# Patient Record
Sex: MALE | Race: WHITE | NOT HISPANIC OR LATINO | Employment: FULL TIME | URBAN - METROPOLITAN AREA
[De-identification: names, ages, dates, MRNs, and addresses within clinical notes are randomized per-mention and may not be internally consistent; named-entity substitution may affect disease eponyms.]

---

## 2021-02-16 ENCOUNTER — HOSPITAL ENCOUNTER (EMERGENCY)
Facility: HOSPITAL | Age: 31
Discharge: HOME | End: 2021-02-16
Attending: EMERGENCY MEDICINE | Admitting: EMERGENCY MEDICINE
Payer: OTHER MISCELLANEOUS

## 2021-02-16 ENCOUNTER — APPOINTMENT (EMERGENCY)
Dept: RADIOLOGY | Facility: HOSPITAL | Age: 31
End: 2021-02-16
Payer: OTHER MISCELLANEOUS

## 2021-02-16 VITALS
HEART RATE: 64 BPM | TEMPERATURE: 97.8 F | OXYGEN SATURATION: 98 % | SYSTOLIC BLOOD PRESSURE: 115 MMHG | RESPIRATION RATE: 15 BRPM | WEIGHT: 160 LBS | DIASTOLIC BLOOD PRESSURE: 70 MMHG

## 2021-02-16 DIAGNOSIS — R06.2 WHEEZING: ICD-10-CM

## 2021-02-16 DIAGNOSIS — T59.811A SMOKE INHALATION: Primary | ICD-10-CM

## 2021-02-16 LAB — GAS + CO PNL BLDA: 2 % (ref 0.5–1.5)

## 2021-02-16 PROCEDURE — 36415 COLL VENOUS BLD VENIPUNCTURE: CPT | Performed by: EMERGENCY MEDICINE

## 2021-02-16 PROCEDURE — 71045 X-RAY EXAM CHEST 1 VIEW: CPT

## 2021-02-16 PROCEDURE — 82375 ASSAY CARBOXYHB QUANT: CPT | Performed by: EMERGENCY MEDICINE

## 2021-02-16 PROCEDURE — 99284 EMERGENCY DEPT VISIT MOD MDM: CPT | Performed by: EMERGENCY MEDICINE

## 2021-02-16 PROCEDURE — 99285 EMERGENCY DEPT VISIT HI MDM: CPT

## 2021-02-16 RX ORDER — ALBUTEROL SULFATE 90 UG/1
2 AEROSOL, METERED RESPIRATORY (INHALATION) ONCE
Status: COMPLETED | OUTPATIENT
Start: 2021-02-16 | End: 2021-02-16

## 2021-02-16 RX ADMIN — ALBUTEROL SULFATE 2 PUFF: 90 AEROSOL, METERED RESPIRATORY (INHALATION) at 22:21
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
## 2021-02-17 NOTE — DISCHARGE INSTRUCTIONS
Your xray and blood levels were reassuring  Use the albuterol every 4-6 hours as needed for coughing      Return if symptoms worsen or if new symptoms develop

## 2022-01-04 DIAGNOSIS — Z11.59 SCREENING FOR VIRAL DISEASE: Primary | ICD-10-CM

## 2022-01-04 PROCEDURE — U0003 INFECTIOUS AGENT DETECTION BY NUCLEIC ACID (DNA OR RNA); SEVERE ACUTE RESPIRATORY SYNDROME CORONAVIRUS 2 (SARS-COV-2) (CORONAVIRUS DISEASE [COVID-19]), AMPLIFIED PROBE TECHNIQUE, MAKING USE OF HIGH THROUGHPUT TECHNOLOGIES AS DESCRIBED BY CMS-2020-01-R: HCPCS | Performed by: PSYCHOLOGIST

## 2022-01-04 PROCEDURE — U0005 INFEC AGEN DETEC AMPLI PROBE: HCPCS | Performed by: PSYCHOLOGIST

## 2022-01-06 ENCOUNTER — HOSPITAL ENCOUNTER (EMERGENCY)
Facility: HOSPITAL | Age: 32
Discharge: HOME | End: 2022-01-06
Attending: EMERGENCY MEDICINE
Payer: COMMERCIAL

## 2022-01-06 VITALS
OXYGEN SATURATION: 98 % | RESPIRATION RATE: 18 BRPM | HEART RATE: 92 BPM | TEMPERATURE: 98.5 F | WEIGHT: 157.19 LBS | SYSTOLIC BLOOD PRESSURE: 132 MMHG | DIASTOLIC BLOOD PRESSURE: 81 MMHG

## 2022-01-06 DIAGNOSIS — Z20.822 EXPOSURE TO CONFIRMED CASE OF COVID-19: ICD-10-CM

## 2022-01-06 DIAGNOSIS — B34.9 VIRAL SYNDROME: Primary | ICD-10-CM

## 2022-01-06 PROCEDURE — 99283 EMERGENCY DEPT VISIT LOW MDM: CPT

## 2022-01-06 PROCEDURE — U0005 INFEC AGEN DETEC AMPLI PROBE: HCPCS | Performed by: EMERGENCY MEDICINE

## 2022-01-06 PROCEDURE — U0003 INFECTIOUS AGENT DETECTION BY NUCLEIC ACID (DNA OR RNA); SEVERE ACUTE RESPIRATORY SYNDROME CORONAVIRUS 2 (SARS-COV-2) (CORONAVIRUS DISEASE [COVID-19]), AMPLIFIED PROBE TECHNIQUE, MAKING USE OF HIGH THROUGHPUT TECHNOLOGIES AS DESCRIBED BY CMS-2020-01-R: HCPCS | Performed by: EMERGENCY MEDICINE

## 2022-01-06 PROCEDURE — 99282 EMERGENCY DEPT VISIT SF MDM: CPT | Performed by: EMERGENCY MEDICINE

## 2022-01-06 NOTE — ED PROVIDER NOTES
History  Chief Complaint   Patient presents with    Flu Symptoms     Pt  reports headache and chills  Pt  states wife covid +     Patient is a 24-year-old male who presents for evaluation of headache, body aches  Patient says the symptoms started 2 days ago  His wife recently tested positive for COVID-19  He denies any chest pain, shortness of breath  Vitals are within normal limits  He is in no acute distress  He is here for a COVID test           None       History reviewed  No pertinent past medical history  History reviewed  No pertinent surgical history  History reviewed  No pertinent family history  I have reviewed and agree with the history as documented  E-Cigarette/Vaping     E-Cigarette/Vaping Substances     Social History     Tobacco Use    Smoking status: Current Every Day Smoker     Packs/day: 1 00     Types: Cigarettes    Smokeless tobacco: Never Used   Substance Use Topics    Alcohol use: Not on file    Drug use: Not on file       Review of Systems   Constitutional: Negative for chills, fever and unexpected weight change  HENT: Negative for congestion, sore throat and trouble swallowing  Eyes: Negative for pain, discharge and itching  Respiratory: Negative for cough, chest tightness, shortness of breath and wheezing  Cardiovascular: Negative for chest pain, palpitations and leg swelling  Gastrointestinal: Negative for abdominal pain, blood in stool, diarrhea, nausea and vomiting  Endocrine: Negative for polyuria  Genitourinary: Negative for difficulty urinating, dysuria, frequency and hematuria  Musculoskeletal: Positive for myalgias  Negative for arthralgias, back pain and neck pain  Neurological: Positive for headaches  Negative for dizziness, syncope, weakness and light-headedness  Physical Exam  Physical Exam  Vitals and nursing note reviewed  Constitutional:       General: He is not in acute distress  Appearance: He is well-developed     HENT: Head: Normocephalic and atraumatic  Right Ear: External ear normal       Left Ear: External ear normal    Eyes:      Conjunctiva/sclera: Conjunctivae normal       Pupils: Pupils are equal, round, and reactive to light  Cardiovascular:      Rate and Rhythm: Normal rate and regular rhythm  Heart sounds: Normal heart sounds  No murmur heard  No friction rub  No gallop  Pulmonary:      Effort: Pulmonary effort is normal  No respiratory distress  Breath sounds: Normal breath sounds  No wheezing or rales  Abdominal:      General: Bowel sounds are normal  There is no distension  Palpations: Abdomen is soft  Tenderness: There is no abdominal tenderness  There is no guarding  Musculoskeletal:         General: No tenderness or deformity  Normal range of motion  Cervical back: Normal range of motion  Lymphadenopathy:      Cervical: No cervical adenopathy  Skin:     General: Skin is warm and dry  Neurological:      General: No focal deficit present  Mental Status: He is alert and oriented to person, place, and time  Mental status is at baseline  Cranial Nerves: No cranial nerve deficit  Sensory: No sensory deficit  Motor: No weakness or abnormal muscle tone  Psychiatric:         Behavior: Behavior normal          Vital Signs  ED Triage Vitals [01/06/22 1538]   Temperature Pulse Respirations Blood Pressure SpO2   98 5 °F (36 9 °C) 92 18 132/81 98 %      Temp Source Heart Rate Source Patient Position - Orthostatic VS BP Location FiO2 (%)   Oral Monitor Lying Right arm --      Pain Score       --           Vitals:    01/06/22 1538   BP: 132/81   Pulse: 92   Patient Position - Orthostatic VS: Lying         Visual Acuity      ED Medications  Medications - No data to display    Diagnostic Studies  Results Reviewed     Procedure Component Value Units Date/Time    COVID only [[id number]] Collected: 01/06/22 1546    Lab Status:  In process Specimen: Nasopharyngeal Swab Updated: 01/06/22 1549                 No orders to display              Procedures  Procedures         ED Course                                             MDM  Number of Diagnoses or Management Options  Exposure to confirmed case of COVID-19  Viral syndrome  Diagnosis management comments: 53-year-old male presenting for headache, myalgias  Wife recently tested positive for COVID-19, patient was unvaccinated  Denies chest pain shortness of breath  Vitals within normal limits  Will swab for COVID  Patient declined Tylenol Motrin here  Disposition  Final diagnoses:   Viral syndrome   Exposure to confirmed case of COVID-19     Time reflects when diagnosis was documented in both MDM as applicable and the Disposition within this note     Time User Action Codes Description Comment    1/6/2022  3:44 PM Jamilah Bright Add [B34 9] Viral syndrome     1/6/2022  3:44 PM Jamilah Bright Add [Z20 822] Exposure to confirmed case of COVID-19       ED Disposition     ED Disposition Condition Date/Time Comment    Discharge Stable Thu Jan 6, 2022  3:44 PM Cherelle Kim discharge to home/self care              Follow-up Information     Follow up With Specialties Details Why Contact Info Additional [address] Emergency Department Emergency Medicine Go to  If symptoms worsen [address]  [place] [address]  [phone] [facility], [address] [place] [address], [address] [place] [address]          Patient's Medications    No medications on file           [address] Review     None          ED Provider  Electronically Signed by           Billy Woodruff DO  01/06/22 1550

## 2022-01-06 NOTE — Clinical Note
Félixyeny Elder was seen and treated in our emergency department on 1/6/2022  Diagnosis: Viral syndrome, Covid exposure    Eric     He may return on this date:      Do not return to work until your covid test has resulted negative  If positive, do not return to work until 10 days from symptom onset  If you have any questions or concerns, please don't hesitate to call        Riddhi Payne, DO    ______________________________           _______________          _______________  Hospital Representative                              Date                                Time

## 2022-01-08 LAB — SARS-COV-2 RNA RESP QL NAA+PROBE: POSITIVE

## 2022-01-08 NOTE — RESULT ENCOUNTER NOTE
I spoke with Tuyet Snell and let him know that his COVID-19 swab was positive  Continue symptomatic treatment  Advised he implement home isolation measures including      Staying home  Stay in a specific "sick room" or area and away from other people or animals, including pets  Wear a mask when leaving your room  Use a separate bathroom, if available  Wipe down all commonly touched surfaces with household

## 2023-02-13 ENCOUNTER — HOSPITAL ENCOUNTER (EMERGENCY)
Facility: HOSPITAL | Age: 33
Discharge: HOME | End: 2023-02-13
Attending: EMERGENCY MEDICINE | Admitting: EMERGENCY MEDICINE

## 2023-02-13 VITALS
OXYGEN SATURATION: 95 % | HEIGHT: 70 IN | HEART RATE: 97 BPM | TEMPERATURE: 98.9 F | RESPIRATION RATE: 18 BRPM | BODY MASS INDEX: 26.48 KG/M2 | DIASTOLIC BLOOD PRESSURE: 72 MMHG | WEIGHT: 185 LBS | SYSTOLIC BLOOD PRESSURE: 136 MMHG

## 2023-02-13 DIAGNOSIS — S00.83XA CONTUSION OF FACE, INITIAL ENCOUNTER: Primary | ICD-10-CM
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
## 2025-05-03 ENCOUNTER — OFFICE VISIT (OUTPATIENT)
Dept: URGENT CARE | Facility: CLINIC | Age: 35
End: 2025-05-03
Payer: COMMERCIAL

## 2025-05-03 VITALS
WEIGHT: 151.4 LBS | RESPIRATION RATE: 18 BRPM | BODY MASS INDEX: 21.72 KG/M2 | OXYGEN SATURATION: 95 % | TEMPERATURE: 97.6 F | DIASTOLIC BLOOD PRESSURE: 73 MMHG | HEART RATE: 94 BPM | SYSTOLIC BLOOD PRESSURE: 117 MMHG

## 2025-05-03 DIAGNOSIS — R68.89 FLU-LIKE SYMPTOMS: Primary | ICD-10-CM

## 2025-05-03 PROCEDURE — 99203 OFFICE O/P NEW LOW 30 MIN: CPT

## 2025-05-03 RX ORDER — BROMPHENIRAMINE MALEATE, PSEUDOEPHEDRINE HYDROCHLORIDE, AND DEXTROMETHORPHAN HYDROBROMIDE 2; 30; 10 MG/5ML; MG/5ML; MG/5ML
5 SYRUP ORAL 4 TIMES DAILY PRN
Qty: 120 ML | Refills: 0 | Status: SHIPPED | OUTPATIENT
Start: 2025-05-03 | End: 2025-05-09

## 2025-05-03 RX ORDER — BUPRENORPHINE HYDROCHLORIDE AND NALOXONE HYDROCHLORIDE DIHYDRATE 2; .5 MG/1; MG/1
1 TABLET SUBLINGUAL DAILY
COMMUNITY

## 2025-05-03 NOTE — PATIENT INSTRUCTIONS
Take Bromphed as Directed.  Fluids and rest  Tylenol/Ibuprofen for pain/fever  Monitor for worsening symptoms

## 2025-05-03 NOTE — PROGRESS NOTES
"  [facility]        NAME: Eric Fournier III is a 34 y.o. male  : 1990    MRN: [id number]  DATE: May 3, 2025  TIME: 2:10 PM    Assessment and Plan   Flu-like symptoms [R68.89]  1. Flu-like symptoms          Discussed with patient symptoms appear viral in nature, no concerning findings for bacterial infection on my exam.  Discussed with patient \"film\" like sensation on tongue secondary to decreased hydration level.  Advised patient to increase fluid intake to prevent dehydration, will treat symptomatically with Bromfed as needed X 6 days.  Advised patient to continue using Tylenol/ibuprofen as needed for fever/aches.  Advised patient to return if symptoms are not improved or significantly worsen over the next 6 days for further medical evaluation and treatment.    Patient Instructions   Take Bromphed as Directed.  Fluids and rest  Tylenol/Ibuprofen for pain/fever  Monitor for worsening symptoms        Follow up with PCP in 3-5 days.  Proceed to  ER if symptoms worsen.    If tests have been performed at [facility], our office will contact you with results if changes need to be made to the care plan discussed with you at the visit.  [facility].    Chief Complaint     Chief Complaint   Patient presents with    Cold Like Symptoms     Started Friday with sore throat, improved until yesterday started with cough, perspiring, foul taste.  Taking OTC Dayquil         History of Present Illness       34-year-old male presenting with 1 week of flulike symptoms.  Patient states his symptoms began with a sore throat, rhinorrhea, nasal congestion.  Patient then states he abruptly experienced headaches, body aches, productive cough, \"film on his tongue\" due to potential dehydration.  Patient denying fevers, shortness of breath, wheezing at this time.  He h has been taking DayQuil and Zyrtec the past few days without relief of his symptoms.        Review of Systems   Review " of Systems   Constitutional:  Positive for diaphoresis. Negative for chills and fever.   HENT:  Positive for congestion and rhinorrhea. Negative for ear pain and sore throat.    Eyes:  Negative for pain and visual disturbance.   Respiratory:  Positive for cough. Negative for chest tightness, shortness of breath and wheezing.    Cardiovascular:  Negative for chest pain and palpitations.   Gastrointestinal:  Negative for abdominal pain, diarrhea, nausea and vomiting.   Genitourinary:  Negative for dysuria and hematuria.   Musculoskeletal:  Positive for myalgias. Negative for arthralgias and back pain.   Skin:  Negative for color change and rash.   Neurological:  Positive for headaches. Negative for seizures and syncope.   All other systems reviewed and are negative.        Current Medications       Current Outpatient Medications:     buprenorphine-naloxone (SUBOXONE) 2-0.5 mg per SL tablet, Place 1 tablet under the tongue daily, Disp: , Rfl:     Current Allergies     Allergies as of 05/03/2025 - Reviewed 05/03/2025   Allergen Reaction Noted    Penicillins Other (See Comments) 02/13/2023            The following portions of the patient's history were reviewed and updated as appropriate: allergies, current medications, past family history, past medical history, past social history, past surgical history and problem list.     History reviewed. No pertinent past medical history.    History reviewed. No pertinent surgical history.    No family history on file.      Medications have been verified.        Objective   /73 (BP Location: Left arm)   Pulse 94   Temp 97.6 °F (36.4 °C) (Skin)   Resp 18   Wt 68.7 kg (151 lb 6.4 oz)   SpO2 95%   BMI 21.72 kg/m²   No LMP for male patient.       Physical Exam     Physical Exam  Constitutional:       General: He is not in acute distress.     Appearance: Normal appearance. He is not ill-appearing.   HENT:      Head: Normocephalic and atraumatic.      Right Ear: Tympanic  membrane, ear canal and external ear normal.      Left Ear: Tympanic membrane, ear canal and external ear normal.      Nose: Nose normal.      Mouth/Throat:      Mouth: Mucous membranes are moist.   Eyes:      Conjunctiva/sclera: Conjunctivae normal.   Cardiovascular:      Rate and Rhythm: Normal rate and regular rhythm.      Pulses: Normal pulses.      Heart sounds: Normal heart sounds.   Pulmonary:      Effort: Pulmonary effort is normal. No respiratory distress.      Breath sounds: Normal breath sounds. No wheezing or rhonchi.   Abdominal:      General: Abdomen is flat.      Palpations: Abdomen is soft.   Skin:     General: Skin is warm and dry.      Capillary Refill: Capillary refill takes less than 2 seconds.   Neurological:      General: No focal deficit present.      Mental Status: He is alert and oriented to person, place, and time.